# Patient Record
Sex: FEMALE | Race: BLACK OR AFRICAN AMERICAN | NOT HISPANIC OR LATINO
[De-identification: names, ages, dates, MRNs, and addresses within clinical notes are randomized per-mention and may not be internally consistent; named-entity substitution may affect disease eponyms.]

---

## 2018-07-05 PROBLEM — Z00.00 ENCOUNTER FOR PREVENTIVE HEALTH EXAMINATION: Status: ACTIVE | Noted: 2018-07-05

## 2018-12-10 ENCOUNTER — APPOINTMENT (OUTPATIENT)
Dept: INTERNAL MEDICINE | Facility: CLINIC | Age: 83
End: 2018-12-10

## 2021-01-11 ENCOUNTER — APPOINTMENT (OUTPATIENT)
Dept: NEUROLOGY | Facility: CLINIC | Age: 86
End: 2021-01-11
Payer: MEDICARE

## 2021-01-11 VITALS
WEIGHT: 179 LBS | SYSTOLIC BLOOD PRESSURE: 157 MMHG | HEART RATE: 94 BPM | TEMPERATURE: 98.7 F | HEIGHT: 67 IN | DIASTOLIC BLOOD PRESSURE: 73 MMHG | OXYGEN SATURATION: 98 % | BODY MASS INDEX: 28.09 KG/M2

## 2021-01-11 DIAGNOSIS — R29.898 OTHER SYMPTOMS AND SIGNS INVOLVING THE MUSCULOSKELETAL SYSTEM: ICD-10-CM

## 2021-01-11 DIAGNOSIS — M25.569 PAIN IN UNSPECIFIED KNEE: ICD-10-CM

## 2021-01-11 DIAGNOSIS — Z78.9 OTHER SPECIFIED HEALTH STATUS: ICD-10-CM

## 2021-01-11 PROCEDURE — 99204 OFFICE O/P NEW MOD 45 MIN: CPT

## 2021-01-11 RX ORDER — BLOOD SUGAR DIAGNOSTIC
STRIP MISCELLANEOUS
Qty: 50 | Refills: 0 | Status: ACTIVE | COMMUNITY
Start: 2020-02-23

## 2021-01-11 RX ORDER — AMLODIPINE BESYLATE 10 MG/1
10 TABLET ORAL
Qty: 30 | Refills: 0 | Status: ACTIVE | COMMUNITY
Start: 2020-11-16

## 2021-01-11 RX ORDER — ROSUVASTATIN CALCIUM 20 MG/1
20 TABLET, FILM COATED ORAL
Qty: 30 | Refills: 0 | Status: ACTIVE | COMMUNITY
Start: 2020-11-22

## 2021-01-11 RX ORDER — LANCETS 28 GAUGE
EACH MISCELLANEOUS
Qty: 100 | Refills: 0 | Status: ACTIVE | COMMUNITY
Start: 2019-11-17

## 2021-01-11 NOTE — HISTORY OF PRESENT ILLNESS
[FreeTextEntry1] : 84 yo woman with medical conditions as outlined below who presents for further evaluation of weakness in the legs and problems with memory. She is accompanied to the visit by her daughter who aided in giving history. She states her mother has had slowly progressive problems with memory for the past 2 years. Patient lives at home alone and is able to care and cook for herself, however, daughter states she noticed that her mother was running out of her medication prescriptions too quickly. She states she now puts the medication in a pillbox for her mother and that seems to be working out well. She denies her mother talking about things that are not happening. She finds she has to repeat herself at times with her mother. She states patient slipped off the couch a month ago, but denies her hitting her head. She denies her having any other recent falls. Patient denies having bowel or bladder problems and states she can feel when she has to go, however, daughter states patient wears depends. Daughter states patient is able to take care of finances on her own.\par \par Patient reports that she has bilateral knee pain and she feels this is limiting her ability to walk. She denies low back pain or neck pain. She is able to walk with a cane. She denies numbness or tingling in her hands or feet. She has no further complaints.

## 2021-01-11 NOTE — PHYSICAL EXAM
[General Appearance - Alert] : alert [General Appearance - In No Acute Distress] : in no acute distress [Sclera] : the sclera and conjunctiva were normal [PERRL With Normal Accommodation] : pupils were equal in size, round, reactive to light, with normal accommodation [Outer Ear] : the ears and nose were normal in appearance [Neck Appearance] : the appearance of the neck was normal [] : no respiratory distress [Skin Color & Pigmentation] : normal skin color and pigmentation [FreeTextEntry1] : Mental Status: AAO x3, no dysarthria, no aphasia, communicating appropriately MMSE 27/30\par CN: PERRL, EOMI, VFF, V1-V3 sensation intact, hearing grossly intact, no facial asymmetry, tongue midline\par Motor: \par R deltoids 5/5\par R  biceps 5/5\par R triceps 5/5\par R finger abduction 5/5\par R thumb abduction 5/5\par \par L deltoids 5/5\par L biceps 5/5\par L triceps 5/5\par L finger abduction 5/5\par L thumb abduction 5/5\par \par R hip flexion 4/5\par R knee extension 5/5\par R knee flexion 5/5\par R dorsiflexion 5/5\par R plantar flexion 5/5\par  \par L hip flexion 4/5\par L knee extension 5/5\par L knee flexion 5/5\par L dorsiflexion 5/5\par L plantar flexion 5/5\par \par \par Sensory: intact to light touch, and pinprick throughout, decreased vibration sense at toes and ankles bilaterally\par Reflexes: absent bilateral ankle and knee jerks bilaterally, 1+ bilateral biceps\par Coordination: no dysmetria on FTN\par Gait: steady with cane, short stride\par

## 2021-01-11 NOTE — ASSESSMENT
[FreeTextEntry1] : 86 yo woman with unsteady gait 2/2 cervical myelopathy vs neuropathy and memory loss 2/2 mild cognitive impairment vs mild dementia. We discussed starting medication for memory problems, but will hold off at this time until gait addressed. Patient's daughter was advised to supervise patient for safety.\par \par MRI brain w/o contrast\par MRI C spine w/o contrast\par bloodwork\par EMG/NCS\par \par Referral to orthopedics for knee pain\par \par If above workup unrevealing, will consider MRI L Spine \par \par I will see her back 1 week after EMG/NCS or sooner if necessary

## 2021-01-20 ENCOUNTER — APPOINTMENT (OUTPATIENT)
Dept: MRI IMAGING | Facility: CLINIC | Age: 86
End: 2021-01-20
Payer: MEDICARE

## 2021-01-20 ENCOUNTER — OUTPATIENT (OUTPATIENT)
Dept: OUTPATIENT SERVICES | Facility: HOSPITAL | Age: 86
LOS: 1 days | End: 2021-01-20

## 2021-01-20 DIAGNOSIS — R29.898 OTHER SYMPTOMS AND SIGNS INVOLVING THE MUSCULOSKELETAL SYSTEM: ICD-10-CM

## 2021-01-20 PROCEDURE — 70551 MRI BRAIN STEM W/O DYE: CPT | Mod: 26

## 2021-01-20 PROCEDURE — 72141 MRI NECK SPINE W/O DYE: CPT | Mod: 26

## 2021-01-22 ENCOUNTER — APPOINTMENT (OUTPATIENT)
Dept: NEUROLOGY | Facility: CLINIC | Age: 86
End: 2021-01-22
Payer: MEDICARE

## 2021-01-22 PROCEDURE — 95886 MUSC TEST DONE W/N TEST COMP: CPT

## 2021-01-22 PROCEDURE — 95910 NRV CNDJ TEST 7-8 STUDIES: CPT

## 2021-01-26 ENCOUNTER — APPOINTMENT (OUTPATIENT)
Dept: NEUROLOGY | Facility: CLINIC | Age: 86
End: 2021-01-26

## 2021-01-26 NOTE — PROCEDURE
[FreeTextEntry3] : EMG/NCS performed. Please see scanned EMG report.\par \par Patient was advised to follow up next week to review EMG/NCS results as well as prior imaging and lab findings.

## 2021-03-25 ENCOUNTER — EMERGENCY (EMERGENCY)
Facility: HOSPITAL | Age: 86
LOS: 1 days | Discharge: HOME | End: 2021-03-25
Attending: STUDENT IN AN ORGANIZED HEALTH CARE EDUCATION/TRAINING PROGRAM
Payer: MEDICARE

## 2021-03-25 VITALS
HEART RATE: 78 BPM | DIASTOLIC BLOOD PRESSURE: 69 MMHG | RESPIRATION RATE: 18 BRPM | SYSTOLIC BLOOD PRESSURE: 141 MMHG | HEIGHT: 67 IN | TEMPERATURE: 99 F | OXYGEN SATURATION: 98 % | WEIGHT: 179.9 LBS

## 2021-03-25 LAB
ALBUMIN SERPL ELPH-MCNC: 4.2 G/DL — SIGNIFICANT CHANGE UP (ref 3.3–5.2)
ALP SERPL-CCNC: 82 U/L — SIGNIFICANT CHANGE UP (ref 40–120)
ALT FLD-CCNC: 21 U/L — SIGNIFICANT CHANGE UP
ANION GAP SERPL CALC-SCNC: 16 MMOL/L — SIGNIFICANT CHANGE UP (ref 5–17)
AST SERPL-CCNC: 25 U/L — SIGNIFICANT CHANGE UP
BASOPHILS # BLD AUTO: 0.04 K/UL — SIGNIFICANT CHANGE UP (ref 0–0.2)
BASOPHILS NFR BLD AUTO: 0.4 % — SIGNIFICANT CHANGE UP (ref 0–2)
BILIRUB SERPL-MCNC: 0.4 MG/DL — SIGNIFICANT CHANGE UP (ref 0.4–2)
BUN SERPL-MCNC: 17 MG/DL — SIGNIFICANT CHANGE UP (ref 8–20)
CALCIUM SERPL-MCNC: 9.5 MG/DL — SIGNIFICANT CHANGE UP (ref 8.6–10.2)
CHLORIDE SERPL-SCNC: 93 MMOL/L — LOW (ref 98–107)
CO2 SERPL-SCNC: 29 MMOL/L — SIGNIFICANT CHANGE UP (ref 22–29)
CREAT SERPL-MCNC: 0.87 MG/DL — SIGNIFICANT CHANGE UP (ref 0.5–1.3)
EOSINOPHIL # BLD AUTO: 0.15 K/UL — SIGNIFICANT CHANGE UP (ref 0–0.5)
EOSINOPHIL NFR BLD AUTO: 1.7 % — SIGNIFICANT CHANGE UP (ref 0–6)
GLUCOSE SERPL-MCNC: 237 MG/DL — HIGH (ref 70–99)
HCT VFR BLD CALC: 40.1 % — SIGNIFICANT CHANGE UP (ref 34.5–45)
HGB BLD-MCNC: 12.8 G/DL — SIGNIFICANT CHANGE UP (ref 11.5–15.5)
IMM GRANULOCYTES NFR BLD AUTO: 0.3 % — SIGNIFICANT CHANGE UP (ref 0–1.5)
LYMPHOCYTES # BLD AUTO: 3.74 K/UL — HIGH (ref 1–3.3)
LYMPHOCYTES # BLD AUTO: 41.4 % — SIGNIFICANT CHANGE UP (ref 13–44)
MCHC RBC-ENTMCNC: 26.8 PG — LOW (ref 27–34)
MCHC RBC-ENTMCNC: 31.9 GM/DL — LOW (ref 32–36)
MCV RBC AUTO: 83.9 FL — SIGNIFICANT CHANGE UP (ref 80–100)
MONOCYTES # BLD AUTO: 0.74 K/UL — SIGNIFICANT CHANGE UP (ref 0–0.9)
MONOCYTES NFR BLD AUTO: 8.2 % — SIGNIFICANT CHANGE UP (ref 2–14)
NEUTROPHILS # BLD AUTO: 4.34 K/UL — SIGNIFICANT CHANGE UP (ref 1.8–7.4)
NEUTROPHILS NFR BLD AUTO: 48 % — SIGNIFICANT CHANGE UP (ref 43–77)
NT-PROBNP SERPL-SCNC: 48 PG/ML — SIGNIFICANT CHANGE UP (ref 0–300)
PLATELET # BLD AUTO: 213 K/UL — SIGNIFICANT CHANGE UP (ref 150–400)
POTASSIUM SERPL-MCNC: 2.9 MMOL/L — CRITICAL LOW (ref 3.5–5.3)
POTASSIUM SERPL-SCNC: 2.9 MMOL/L — CRITICAL LOW (ref 3.5–5.3)
PROT SERPL-MCNC: 8.2 G/DL — SIGNIFICANT CHANGE UP (ref 6.6–8.7)
RBC # BLD: 4.78 M/UL — SIGNIFICANT CHANGE UP (ref 3.8–5.2)
RBC # FLD: 14.8 % — HIGH (ref 10.3–14.5)
SODIUM SERPL-SCNC: 138 MMOL/L — SIGNIFICANT CHANGE UP (ref 135–145)
TROPONIN T SERPL-MCNC: <0.01 NG/ML — SIGNIFICANT CHANGE UP (ref 0–0.06)
WBC # BLD: 9.04 K/UL — SIGNIFICANT CHANGE UP (ref 3.8–10.5)
WBC # FLD AUTO: 9.04 K/UL — SIGNIFICANT CHANGE UP (ref 3.8–10.5)

## 2021-03-25 PROCEDURE — 93010 ELECTROCARDIOGRAM REPORT: CPT

## 2021-03-25 PROCEDURE — 71045 X-RAY EXAM CHEST 1 VIEW: CPT | Mod: 26

## 2021-03-25 PROCEDURE — 99285 EMERGENCY DEPT VISIT HI MDM: CPT

## 2021-03-25 RX ORDER — DEXTROSE 50 % IN WATER 50 %
15 SYRINGE (ML) INTRAVENOUS ONCE
Refills: 0 | Status: DISCONTINUED | OUTPATIENT
Start: 2021-03-25 | End: 2021-03-30

## 2021-03-25 RX ORDER — SODIUM CHLORIDE 9 MG/ML
1000 INJECTION, SOLUTION INTRAVENOUS
Refills: 0 | Status: DISCONTINUED | OUTPATIENT
Start: 2021-03-25 | End: 2021-03-30

## 2021-03-25 RX ORDER — SODIUM CHLORIDE 9 MG/ML
1000 INJECTION INTRAMUSCULAR; INTRAVENOUS; SUBCUTANEOUS
Refills: 0 | Status: DISCONTINUED | OUTPATIENT
Start: 2021-03-25 | End: 2021-03-30

## 2021-03-25 RX ORDER — POTASSIUM CHLORIDE 20 MEQ
40 PACKET (EA) ORAL EVERY 4 HOURS
Refills: 0 | Status: COMPLETED | OUTPATIENT
Start: 2021-03-25 | End: 2021-03-26

## 2021-03-25 RX ORDER — CHLORTHALIDONE 50 MG
50 TABLET ORAL DAILY
Refills: 0 | Status: DISCONTINUED | OUTPATIENT
Start: 2021-03-25 | End: 2021-03-25

## 2021-03-25 RX ORDER — GLUCAGON INJECTION, SOLUTION 0.5 MG/.1ML
1 INJECTION, SOLUTION SUBCUTANEOUS ONCE
Refills: 0 | Status: DISCONTINUED | OUTPATIENT
Start: 2021-03-25 | End: 2021-03-30

## 2021-03-25 RX ORDER — CHLORTHALIDONE 50 MG
50 TABLET ORAL ONCE
Refills: 0 | Status: DISCONTINUED | OUTPATIENT
Start: 2021-03-25 | End: 2021-03-26

## 2021-03-25 RX ORDER — GLYBURIDE 5 MG
2.5 TABLET ORAL ONCE
Refills: 0 | Status: COMPLETED | OUTPATIENT
Start: 2021-03-25 | End: 2021-03-25

## 2021-03-25 RX ORDER — AMLODIPINE BESYLATE 2.5 MG/1
10 TABLET ORAL ONCE
Refills: 0 | Status: COMPLETED | OUTPATIENT
Start: 2021-03-25 | End: 2021-03-25

## 2021-03-25 RX ORDER — ATORVASTATIN CALCIUM 80 MG/1
80 TABLET, FILM COATED ORAL AT BEDTIME
Refills: 0 | Status: DISCONTINUED | OUTPATIENT
Start: 2021-03-25 | End: 2021-03-30

## 2021-03-25 RX ORDER — INSULIN LISPRO 100/ML
VIAL (ML) SUBCUTANEOUS
Refills: 0 | Status: DISCONTINUED | OUTPATIENT
Start: 2021-03-25 | End: 2021-03-30

## 2021-03-25 RX ORDER — LEVOTHYROXINE SODIUM 125 MCG
75 TABLET ORAL DAILY
Refills: 0 | Status: DISCONTINUED | OUTPATIENT
Start: 2021-03-25 | End: 2021-03-30

## 2021-03-25 RX ORDER — DEXTROSE 50 % IN WATER 50 %
25 SYRINGE (ML) INTRAVENOUS ONCE
Refills: 0 | Status: DISCONTINUED | OUTPATIENT
Start: 2021-03-25 | End: 2021-03-30

## 2021-03-25 RX ORDER — MAGNESIUM SULFATE 500 MG/ML
2 VIAL (ML) INJECTION ONCE
Refills: 0 | Status: COMPLETED | OUTPATIENT
Start: 2021-03-25 | End: 2021-03-25

## 2021-03-25 RX ORDER — DEXTROSE 50 % IN WATER 50 %
12.5 SYRINGE (ML) INTRAVENOUS ONCE
Refills: 0 | Status: DISCONTINUED | OUTPATIENT
Start: 2021-03-25 | End: 2021-03-30

## 2021-03-25 RX ORDER — POTASSIUM CHLORIDE 20 MEQ
10 PACKET (EA) ORAL ONCE
Refills: 0 | Status: COMPLETED | OUTPATIENT
Start: 2021-03-25 | End: 2021-03-25

## 2021-03-25 RX ORDER — POTASSIUM CHLORIDE 20 MEQ
10 PACKET (EA) ORAL DAILY
Refills: 0 | Status: DISCONTINUED | OUTPATIENT
Start: 2021-03-25 | End: 2021-03-30

## 2021-03-25 RX ADMIN — Medication 100 MILLIEQUIVALENT(S): at 22:18

## 2021-03-25 RX ADMIN — Medication 40 MILLIEQUIVALENT(S): at 22:17

## 2021-03-25 NOTE — ED ADULT NURSE NOTE - OBJECTIVE STATEMENT
Patient A&Ox4 complaining of dizziness.  Pt reports sitting in chair and feeling dizzy at home.  Pt reports feeling od dizziness has passed.  Denies, LOC, SOB, CP.  Equal b/l strength, no facial droop present, no slurred speech.  Cardiac monitoring and pulse oximetry initiated, NSR on monitor. NAD noted, respirations even and unlabored.  Safety precautions in place.  Plan of care explained, pt verbalized understanding.

## 2021-03-25 NOTE — ED PROVIDER NOTE - OBJECTIVE STATEMENT
84 y/o female with PMHx of HTN, DM c/o feeling Nausea indigestion syncope vs near syncope? at home. Pt states she was okay earlier today was sitting in chair may having synopsized and still feels weak. Denies SOB, fever chills active CP, Endorses indigestion.  No other complaints     [facility] normal stress test last year,

## 2021-03-25 NOTE — ED PROVIDER NOTE - NS ED ROS FT
ROS: No fever/chills. No eye pain/changes in vision, No ear pain/sore throat/dysphagia, No chest pain/palpitations. No SOB/cough/. + nausea, indigestion No abdominal pain, V/D, no black/bloody bm. No dysuria/frequency/discharge, No headache. No Dizziness.    No rashes or breaks in skin. + weakness No numbness/tingling.

## 2021-03-25 NOTE — ED ADULT NURSE NOTE - NSIMPLEMENTINTERV_GEN_ALL_ED
Implemented All Fall Risk Interventions:  [place] to call system. Call bell, personal items and telephone within reach. Instruct patient to call for assistance. Room bathroom lighting operational. Non-slip footwear when patient is off stretcher. Physically safe environment: no spills, clutter or unnecessary equipment. Stretcher in lowest position, wheels locked, appropriate side rails in place. Provide visual cue, wrist band, yellow gown, etc. Monitor gait and stability. Monitor for mental status changes and reorient to person, place, and time. Review medications for side effects contributing to fall risk. Reinforce activity limits and safety measures with patient and family.

## 2021-03-25 NOTE — ED ADULT TRIAGE NOTE - CHIEF COMPLAINT QUOTE
pt A&Ox 4 BIBA from home s/p syncopal episode while sitting in chair witnessed by daughter. EMS said house was hot and pt just brought in groceries. Pt currently c/o feeling weak but denies any other symptoms at this time. no neuro deficits noted. Pt denies any known cardiac history

## 2021-03-25 NOTE — ED PROVIDER NOTE - CLINICAL SUMMARY MEDICAL DECISION MAKING FREE TEXT BOX
syncope vs near syncope bifascicular block on ekg w/o prior ekg , [facility], recommend co trop and tele monitoring overnight, re-asses in morning

## 2021-03-25 NOTE — ED PROVIDER NOTE - PHYSICAL EXAMINATION
Gen: No acute distress, non toxic, slightly  rundown  appearing  HEENT: Mucous membranes moist, pink conjunctivae, EOMI  CV: RRR, nl s1/s2. equal pulse bilateral  Resp: CTAB, normal rate and effort  GI: Abdomen soft, NT, ND. No rebound, no guarding  : No CVAT  Neuro: A&O x 3, moving all 4 extremities, normal motor strength sensation cn 2-12  MSK: No spine or joint tenderness to palpation 1+ pitting edema bilaterally  Skin: No rashes. intact and perfused.

## 2021-03-26 VITALS
DIASTOLIC BLOOD PRESSURE: 73 MMHG | HEART RATE: 78 BPM | TEMPERATURE: 98 F | SYSTOLIC BLOOD PRESSURE: 152 MMHG | OXYGEN SATURATION: 99 % | RESPIRATION RATE: 18 BRPM

## 2021-03-26 DIAGNOSIS — Z90.711 ACQUIRED ABSENCE OF UTERUS WITH REMAINING CERVICAL STUMP: Chronic | ICD-10-CM

## 2021-03-26 LAB
ANION GAP SERPL CALC-SCNC: 11 MMOL/L — SIGNIFICANT CHANGE UP (ref 5–17)
BUN SERPL-MCNC: 13 MG/DL — SIGNIFICANT CHANGE UP (ref 8–20)
CALCIUM SERPL-MCNC: 9.2 MG/DL — SIGNIFICANT CHANGE UP (ref 8.6–10.2)
CHLORIDE SERPL-SCNC: 100 MMOL/L — SIGNIFICANT CHANGE UP (ref 98–107)
CO2 SERPL-SCNC: 27 MMOL/L — SIGNIFICANT CHANGE UP (ref 22–29)
CREAT SERPL-MCNC: 0.77 MG/DL — SIGNIFICANT CHANGE UP (ref 0.5–1.3)
GLUCOSE BLDC GLUCOMTR-MCNC: 135 MG/DL — HIGH (ref 70–99)
GLUCOSE BLDC GLUCOMTR-MCNC: 173 MG/DL — HIGH (ref 70–99)
GLUCOSE SERPL-MCNC: 181 MG/DL — HIGH (ref 70–99)
HCT VFR BLD CALC: 38.7 % — SIGNIFICANT CHANGE UP (ref 34.5–45)
HGB BLD-MCNC: 12.6 G/DL — SIGNIFICANT CHANGE UP (ref 11.5–15.5)
MAGNESIUM SERPL-MCNC: 1.9 MG/DL — SIGNIFICANT CHANGE UP (ref 1.6–2.6)
MAGNESIUM SERPL-MCNC: 3 MG/DL — HIGH (ref 1.6–2.6)
MCHC RBC-ENTMCNC: 27.6 PG — SIGNIFICANT CHANGE UP (ref 27–34)
MCHC RBC-ENTMCNC: 32.6 GM/DL — SIGNIFICANT CHANGE UP (ref 32–36)
MCV RBC AUTO: 84.9 FL — SIGNIFICANT CHANGE UP (ref 80–100)
PLATELET # BLD AUTO: 191 K/UL — SIGNIFICANT CHANGE UP (ref 150–400)
POTASSIUM SERPL-MCNC: 4 MMOL/L — SIGNIFICANT CHANGE UP (ref 3.5–5.3)
POTASSIUM SERPL-SCNC: 4 MMOL/L — SIGNIFICANT CHANGE UP (ref 3.5–5.3)
RBC # BLD: 4.56 M/UL — SIGNIFICANT CHANGE UP (ref 3.8–5.2)
RBC # FLD: 14.7 % — HIGH (ref 10.3–14.5)
SODIUM SERPL-SCNC: 138 MMOL/L — SIGNIFICANT CHANGE UP (ref 135–145)
TROPONIN T SERPL-MCNC: <0.01 NG/ML — SIGNIFICANT CHANGE UP (ref 0–0.06)
TROPONIN T SERPL-MCNC: <0.01 NG/ML — SIGNIFICANT CHANGE UP (ref 0–0.06)
WBC # BLD: 8.63 K/UL — SIGNIFICANT CHANGE UP (ref 3.8–10.5)
WBC # FLD AUTO: 8.63 K/UL — SIGNIFICANT CHANGE UP (ref 3.8–10.5)

## 2021-03-26 PROCEDURE — 85025 COMPLETE CBC W/AUTO DIFF WBC: CPT

## 2021-03-26 PROCEDURE — 82962 GLUCOSE BLOOD TEST: CPT

## 2021-03-26 PROCEDURE — 85027 COMPLETE CBC AUTOMATED: CPT

## 2021-03-26 PROCEDURE — 36415 COLL VENOUS BLD VENIPUNCTURE: CPT

## 2021-03-26 PROCEDURE — 93010 ELECTROCARDIOGRAM REPORT: CPT

## 2021-03-26 PROCEDURE — 71045 X-RAY EXAM CHEST 1 VIEW: CPT

## 2021-03-26 PROCEDURE — 80053 COMPREHEN METABOLIC PANEL: CPT

## 2021-03-26 PROCEDURE — 96366 THER/PROPH/DIAG IV INF ADDON: CPT

## 2021-03-26 PROCEDURE — G0378: CPT

## 2021-03-26 PROCEDURE — 99284 EMERGENCY DEPT VISIT MOD MDM: CPT | Mod: 25

## 2021-03-26 PROCEDURE — 83735 ASSAY OF MAGNESIUM: CPT

## 2021-03-26 PROCEDURE — 80048 BASIC METABOLIC PNL TOTAL CA: CPT

## 2021-03-26 PROCEDURE — 83880 ASSAY OF NATRIURETIC PEPTIDE: CPT

## 2021-03-26 PROCEDURE — 96365 THER/PROPH/DIAG IV INF INIT: CPT

## 2021-03-26 PROCEDURE — 99234 HOSP IP/OBS SM DT SF/LOW 45: CPT

## 2021-03-26 PROCEDURE — 84484 ASSAY OF TROPONIN QUANT: CPT

## 2021-03-26 PROCEDURE — 96367 TX/PROPH/DG ADDL SEQ IV INF: CPT

## 2021-03-26 PROCEDURE — 93005 ELECTROCARDIOGRAM TRACING: CPT

## 2021-03-26 RX ORDER — ACETAMINOPHEN 500 MG
650 TABLET ORAL EVERY 6 HOURS
Refills: 0 | Status: DISCONTINUED | OUTPATIENT
Start: 2021-03-26 | End: 2021-03-30

## 2021-03-26 RX ORDER — CHLORTHALIDONE 50 MG
50 TABLET ORAL DAILY
Refills: 0 | Status: DISCONTINUED | OUTPATIENT
Start: 2021-03-26 | End: 2021-03-30

## 2021-03-26 RX ADMIN — Medication 50 MILLIGRAM(S): at 12:42

## 2021-03-26 RX ADMIN — Medication 40 MILLIEQUIVALENT(S): at 04:03

## 2021-03-26 RX ADMIN — Medication 10 MILLIEQUIVALENT(S): at 12:42

## 2021-03-26 RX ADMIN — SODIUM CHLORIDE 75 MILLILITER(S): 9 INJECTION INTRAMUSCULAR; INTRAVENOUS; SUBCUTANEOUS at 04:05

## 2021-03-26 RX ADMIN — Medication 2 GRAM(S): at 03:48

## 2021-03-26 RX ADMIN — Medication 650 MILLIGRAM(S): at 05:47

## 2021-03-26 RX ADMIN — Medication 50 GRAM(S): at 01:57

## 2021-03-26 RX ADMIN — Medication 2: at 09:05

## 2021-03-26 RX ADMIN — Medication 650 MILLIGRAM(S): at 04:04

## 2021-03-26 RX ADMIN — Medication 10 MILLIEQUIVALENT(S): at 00:09

## 2021-03-26 NOTE — CONSULT NOTE ADULT - ASSESSMENT
86 yo F w/ h/o RBBB/LAFB, normal LVEF/normal nuclear stress test 2019, DM, HTN, HLD who p/w lightheadedness and denies syncope, chest pain, dyspnea, palpitations.       *03/26- telemetry is negative, ECG unchanged, no report of syncope       PLAN:    1. *Patient OK for discharge from a cardiac perspective  2. Continue with regular cardiovascular regimen  3. Outpt f/u 2 weeks

## 2021-03-26 NOTE — ED ADULT NURSE REASSESSMENT NOTE - NS ED NURSE REASSESS COMMENT FT1
Pt received  in the stretcher  resting comfortably, no distress noted , VSS , awaiting cardiology eval , will continue to monitor

## 2021-03-26 NOTE — ED CDU PROVIDER DISPOSITION NOTE - PATIENT PORTAL LINK FT
You can access the FollowMyHealth Patient Portal offered by [facility] by registering at the following website: [email or web address]. By joining [employer]s FollowMyHealth portal, you will also be able to view your health information using other applications (apps) compatible with our system.

## 2021-03-26 NOTE — ED CDU PROVIDER INITIAL DAY NOTE - OBJECTIVE STATEMENT
86 y/o female with PMHx of HTN, DM c/o feeling Nausea indigestion syncope vs near syncope? at home. Pt states she was okay earlier today was sitting in chair may having synopsized and still feels weak. Denies SOB, fever chills active CP, Endorses indigestion.  No other complaints     H&H stable, initial trop was negative. Pt found to have hypokalemia of 2.9. Pt notes she takes potassium daily. When speaking to daughter, she noted that stool had been black in the past few weeks. Pt notes black stool has subsided at this time. Not currently on blood thinners. Hemoglobin of 12.8. Pt denies chest pain, shortness of breath at this time.    At  [facility] normal stress test last year, 86 y/o female with PMHx of HTN, DM c/o feeling Nausea indigestion syncope vs near syncope? at home. Pt states she was okay earlier today was sitting in chair may having synopsized and still feels weak. Denies SOB, fever chills active CP, Endorses indigestion.  No other complaints     H&H stable, initial trop was negative. Pt found to have hypokalemia of 2.9. Pt notes she takes potassium daily. When speaking to daughter, she noted that stool had been black in the past few weeks. Pt notes black stool has subsided at this time. Not currently on blood thinners. Hemoglobin of 12.8. Pt denies chest pain, shortness of breath at this time. EKG shows bifascicular block, no prior ekg noted    At  [facility] normal stress test last year,

## 2021-03-26 NOTE — ED CDU PROVIDER DISPOSITION NOTE - NSFOLLOWUPINSTRUCTIONS_ED_ALL_ED_FT
please follow with cardiology office in the next two weeks   please continue with regular cardiovascular regimen    Syncope    Syncope is when you temporarily lose consciousness, also called fainting or passing out. It is caused by a sudden decrease in blood flow to the brain. Even though most causes of syncope are not dangerous, syncope can possibly be a sign of a serious medical problem. Signs that you may be about to faint include feeling dizzy, lightheaded, nausea, visual changes, or cold/clammy skin. Do not drive, operate heavy machinery, or play sports until your health care provider says it is okay.    SEEK IMMEDIATE MEDICAL CARE IF YOU HAVE ANY OF THE FOLLOWING SYMPTOMS: severe headache, pain in your chest/abdomen/back, bleeding from your mouth or rectum, palpitations, shortness of breath, pain with breathing, seizure, confusion, or trouble walking.

## 2021-03-26 NOTE — ED CDU PROVIDER DISPOSITION NOTE - CLINICAL COURSE
84 yo male hx of htn hld dm presenting to er with near syncopal episode. placed in observation for tele monitoring serial trops and cards consult. pt asymptomatic no chest pain stable vitals. cleared by cardiology for discharge with fu with cardiology in 2 weeks

## 2021-03-26 NOTE — ED CDU PROVIDER DISPOSITION NOTE - ATTENDING CONTRIBUTION TO CARE
86 yo female present for evaluation of syncope. I personally saw the patient with the PA, and completed the key components of the history and physical exam. I then discussed the management plan with the PA.

## 2021-03-26 NOTE — ED CDU PROVIDER DISPOSITION NOTE - CARE PROVIDER_API CALL
Cristiano Phillip  Cardiovascular Diseases  [address] [place] [address]  Phone: [phone]  Fax: [phone]  Follow Up Time: Routine

## 2021-03-26 NOTE — ED CDU PROVIDER INITIAL DAY NOTE - MEDICAL DECISION MAKING DETAILS
Telemetry, trend troponins, cbc, re-evaluate, cardiac consult in the am, [facility]. If h&H dropping on repeat, will perform stool guaiac

## 2021-03-26 NOTE — ED CDU PROVIDER INITIAL DAY NOTE - PMH
HLD (hyperlipidemia)    HTN (hypertension)    Hypokalemia    Hypothyroidism    Type 2 diabetes mellitus

## 2021-03-26 NOTE — ED CDU PROVIDER INITIAL DAY NOTE - ATTENDING CONTRIBUTION TO CARE
I agree with the PA's note and was available for any issues/concerns. I was directly involved in patient care. My brief overall assessment is as follows: bifasicular block without prior ekg, near syncope and indigestion/weakness and hypokalemia. [facility] to see pt in morning, replete k, trend h and h (pt later states had black stool that resolved with hgb 12.8).

## 2021-03-26 NOTE — ED ADULT NURSE REASSESSMENT NOTE - COMFORT CARE
assisted to bathroom/plan of care explained/repositioned/treatment delay explained/wait time explained/warm blanket provided

## 2021-03-26 NOTE — CONSULT NOTE ADULT - SUBJECTIVE AND OBJECTIVE BOX
Patient is a 85y old  Female who presents with a chief complaint of       PAST MEDICAL & SURGICAL HISTORY:  Hypothyroidism    Hypokalemia    Type 2 diabetes mellitus    HTN (hypertension)    HLD (hyperlipidemia)    History of partial hysterectomy        Summary of admission HPI:                PREVIOUS DIAGNOSTIC TESTING:      ECHO  FINDINGS:    STRESS  FINDINGS:    CATHETERIZATION  FINDINGS:    ELECTROPHYSIOLOGY STUDY  FINDINGS:    CAROTID ULTRASOUND:  FINDINGS    VENOUS DUPLEX SCAN:  FINDINGS:    CHEST CT PULMONARY ANGIO with IV Contrast:  FINDINGS:  MEDICATIONS  (STANDING):  atorvastatin 80 milliGRAM(s) Oral at bedtime  chlorthalidone 50 milliGRAM(s) Oral daily  dextrose 40% Gel 15 Gram(s) Oral once  dextrose 5%. 1000 milliLiter(s) (50 mL/Hr) IV Continuous <Continuous>  dextrose 5%. 1000 milliLiter(s) (100 mL/Hr) IV Continuous <Continuous>  dextrose 50% Injectable 25 Gram(s) IV Push once  dextrose 50% Injectable 12.5 Gram(s) IV Push once  dextrose 50% Injectable 25 Gram(s) IV Push once  glucagon  Injectable 1 milliGRAM(s) IntraMuscular once  insulin lispro (ADMELOG) corrective regimen sliding scale   SubCutaneous three times a day before meals  levothyroxine 75 MICROGram(s) Oral daily  potassium chloride    Tablet ER 10 milliEquivalent(s) Oral daily  sodium chloride 0.9%. 1000 milliLiter(s) (75 mL/Hr) IV Continuous <Continuous>    MEDICATIONS  (PRN):  acetaminophen   Tablet .. 650 milliGRAM(s) Oral every 6 hours PRN Mild Pain (1 - 3), Moderate Pain (4 - 6)      FAMILY HISTORY:  No pertinent family history in first degree relatives        SOCIAL HISTORY:    CIGARETTES:    ALCOHOL:    REVIEW OF SYSTEMS:    CONSTITUTIONAL: No fever, weight loss, chills, shakes, or fatigue  EYES: No eye pain, visual disturbances, or discharge  ENMT:  No difficulty hearing, tinnitus, vertigo; No sinus or throat pain  NECK: No pain or stiffness  BREASTS: No pain, masses, or nipple discharge  RESPIRATORY: No cough, wheezing, hemoptysis, or shortness of breath  CARDIOVASCULAR: No chest pain, dyspnea, palpitations, dizziness, syncope, paroxysmal nocturnal dyspnea, orthopnea, or arm or leg swelling  GASTROINTESTINAL: No abdominal  or epigastric pain, nausea, vomiting, hematemesis, diarrhea, constipation, melena or bright red blood.  GENITOURINARY: No dysuria, nocturia, hematuria, or urinary incontinence  NEUROLOGICAL: No headaches, memory loss, slurred speech, limb weakness, loss of strength, numbness, or tremors  SKIN: No itching, burning, rashes, or lesions   LYMPH NODES: No enlarged glands  ENDOCRINE: No heat or cold intolerance, or hair loss  MUSCULOSKELETAL: No joint pain or swelling, muscle, back, or extremity pain  PSYCHIATRIC: No depression, anxiety, or difficulty sleeping  HEME/LYMPH: No easy bruising or bleeding gums  ALLERY AND IMMUNOLOGIC: No hives or rash.      Vital Signs Last 24 Hrs  T(C): 37.1 (26 Mar 2021 07:42), Max: 37.2 (25 Mar 2021 19:15)  T(F): 98.8 (26 Mar 2021 07:42), Max: 98.9 (25 Mar 2021 19:15)  HR: 83 (26 Mar 2021 07:42) (78 - 98)  BP: 135/63 (26 Mar 2021 07:42) (126/60 - 141/69)  BP(mean): --  RR: 18 (26 Mar 2021 07:42) (18 - 19)  SpO2: 97% (26 Mar 2021 07:42) (96% - 98%)    PHYSICAL EXAM:    GENERAL: In no apparent distress, well nourished, and hydrated.  HEAD:  Atraumatic, Normocephalic  EYES: EOMI, PERRLA, conjunctiva and sclera clear  ENMT: No tonsillar erythema, exudates, or enlargement; Moist mucous membranes, Good dentition, No lesions  NECK: Supple and normal thyroid.  No JVD or carotid bruit.  Carotid pulse is 2+ bilaterally.  HEART: Regular rate and rhythm; No murmurs, rubs, or gallops.  PULMONARY: Clear to auscultation and perfusion.  No rales, wheezing, or rhonchi bilaterally.  ABDOMEN: Soft, Nontender, Nondistended; Bowel sounds present  EXTREMITIES:  2+ Peripheral Pulses, No clubbing, cyanosis, or edema  LYMPH: No lymphadenopathy noted  NEUROLOGICAL: Grossly nonfocal          INTERPRETATION OF TELEMETRY:    ECG:    I&O's Detail      LABS:                        12.6   8.63  )-----------( 191      ( 26 Mar 2021 04:12 )             38.7     03-26    138  |  100  |  13.0  ----------------------------<  181<H>  4.0   |  27.0  |  0.77    Ca    9.2      26 Mar 2021 06:46  Mg     3.0     03-26    TPro  8.2  /  Alb  4.2  /  TBili  0.4  /  DBili  x   /  AST  25  /  ALT  21  /  AlkPhos  82  03-25    CARDIAC MARKERS ( 26 Mar 2021 04:12 )  x     / <0.01 ng/mL / x     / x     / x      CARDIAC MARKERS ( 26 Mar 2021 02:17 )  x     / <0.01 ng/mL / x     / x     / x      CARDIAC MARKERS ( 25 Mar 2021 20:09 )  x     / <0.01 ng/mL / x     / x     / x              BNPSerum Pro-Brain Natriuretic Peptide: 48 pg/mL (03-25 @ 20:09)    I&O's Detail    Daily Height in cm: 170.18 (25 Mar 2021 19:15)    Daily     RADIOLOGY & ADDITIONAL STUDIES: Patient is a 85y old  Female who presents with a chief complaint of       PAST MEDICAL & SURGICAL HISTORY:  Hypothyroidism    Hypokalemia    Type 2 diabetes mellitus    HTN (hypertension)    HLD (hyperlipidemia)    History of partial hysterectomy        Summary of admission HPI:    84 yo F w/ h/o RBBB/LAFB, normal LVEF/normal nuclear stress test 2019, DM, HTN, HLD who p/w lightheadedness and denies syncope, chest pain, dyspnea, palpitations.               CHEST CT PULMONARY ANGIO with IV Contrast:  FINDINGS:  MEDICATIONS  (STANDING):  atorvastatin 80 milliGRAM(s) Oral at bedtime  chlorthalidone 50 milliGRAM(s) Oral daily  dextrose 40% Gel 15 Gram(s) Oral once  dextrose 5%. 1000 milliLiter(s) (50 mL/Hr) IV Continuous <Continuous>  dextrose 5%. 1000 milliLiter(s) (100 mL/Hr) IV Continuous <Continuous>  dextrose 50% Injectable 25 Gram(s) IV Push once  dextrose 50% Injectable 12.5 Gram(s) IV Push once  dextrose 50% Injectable 25 Gram(s) IV Push once  glucagon  Injectable 1 milliGRAM(s) IntraMuscular once  insulin lispro (ADMELOG) corrective regimen sliding scale   SubCutaneous three times a day before meals  levothyroxine 75 MICROGram(s) Oral daily  potassium chloride    Tablet ER 10 milliEquivalent(s) Oral daily  sodium chloride 0.9%. 1000 milliLiter(s) (75 mL/Hr) IV Continuous <Continuous>    MEDICATIONS  (PRN):  acetaminophen   Tablet .. 650 milliGRAM(s) Oral every 6 hours PRN Mild Pain (1 - 3), Moderate Pain (4 - 6)      FAMILY HISTORY:  No pertinent family history in first degree relatives      REVIEW OF SYSTEMS:    CONSTITUTIONAL: No fever, weight loss, chills, shakes, or fatigue  EYES: No eye pain, visual disturbances, or discharge  ENMT:  No difficulty hearing, tinnitus, vertigo; No sinus or throat pain  SKIN: No itching, burning, rashes, or lesions   LYMPH NODES: No enlarged glands  ENDOCRINE: No heat or cold intolerance, or hair loss  MUSCULOSKELETAL: No joint pain or swelling, muscle, back, or extremity pain  PSYCHIATRIC: No depression, anxiety, or difficulty sleeping  HEME/LYMPH: No easy bruising or bleeding gums  ALLERY AND IMMUNOLOGIC: No hives or rash.      Vital Signs Last 24 Hrs  T(C): 37.1 (26 Mar 2021 07:42), Max: 37.2 (25 Mar 2021 19:15)  T(F): 98.8 (26 Mar 2021 07:42), Max: 98.9 (25 Mar 2021 19:15)  HR: 83 (26 Mar 2021 07:42) (78 - 98)  BP: 135/63 (26 Mar 2021 07:42) (126/60 - 141/69)  BP(mean): --  RR: 18 (26 Mar 2021 07:42) (18 - 19)  SpO2: 97% (26 Mar 2021 07:42) (96% - 98%)    PHYSICAL EXAM:    GENERAL: In no apparent distress, well nourished, and hydrated.  NECK: Supple and normal thyroid.  No JVD or carotid bruit.  Carotid pulse is 2+ bilaterally.  HEART: Regular rate and rhythm; No murmurs, rubs, or gallops.  PULMONARY: Clear to auscultation and perfusion.  No rales, wheezing, or rhonchi bilaterally.  ABDOMEN: Soft, Nontender, Nondistended; Bowel sounds present  EXTREMITIES:  2+ Peripheral Pulses, No clubbing, cyanosis, or edema        INTERPRETATION OF TELEMETRY: sinus rhythm no significant events     ECG: sinus rhythm, RBBB, LAFB     I&O's Detail      LABS:                        12.6   8.63  )-----------( 191      ( 26 Mar 2021 04:12 )             38.7     03-26    138  |  100  |  13.0  ----------------------------<  181<H>  4.0   |  27.0  |  0.77    Ca    9.2      26 Mar 2021 06:46  Mg     3.0     03-26    TPro  8.2  /  Alb  4.2  /  TBili  0.4  /  DBili  x   /  AST  25  /  ALT  21  /  AlkPhos  82  03-25    CARDIAC MARKERS ( 26 Mar 2021 04:12 )  x     / <0.01 ng/mL / x     / x     / x      CARDIAC MARKERS ( 26 Mar 2021 02:17 )  x     / <0.01 ng/mL / x     / x     / x      CARDIAC MARKERS ( 25 Mar 2021 20:09 )  x     / <0.01 ng/mL / x     / x     / x              BNPSerum Pro-Brain Natriuretic Peptide: 48 pg/mL (03-25 @ 20:09)    I&O's Detail    Daily Height in cm: 170.18 (25 Mar 2021 19:15)    Daily     RADIOLOGY & ADDITIONAL STUDIES:

## 2021-03-29 PROBLEM — E87.6 HYPOKALEMIA: Chronic | Status: ACTIVE | Noted: 2021-03-26

## 2021-03-29 PROBLEM — I10 ESSENTIAL (PRIMARY) HYPERTENSION: Chronic | Status: ACTIVE | Noted: 2021-03-26

## 2021-03-29 PROBLEM — E03.9 HYPOTHYROIDISM, UNSPECIFIED: Chronic | Status: ACTIVE | Noted: 2021-03-26

## 2021-03-29 PROBLEM — E11.9 TYPE 2 DIABETES MELLITUS WITHOUT COMPLICATIONS: Chronic | Status: ACTIVE | Noted: 2021-03-26

## 2021-03-29 PROBLEM — E78.5 HYPERLIPIDEMIA, UNSPECIFIED: Chronic | Status: ACTIVE | Noted: 2021-03-26

## 2021-05-14 ENCOUNTER — APPOINTMENT (OUTPATIENT)
Dept: NEUROLOGY | Facility: CLINIC | Age: 86
End: 2021-05-14

## 2021-06-08 ENCOUNTER — APPOINTMENT (OUTPATIENT)
Dept: NEUROLOGY | Facility: CLINIC | Age: 86
End: 2021-06-08
Payer: MEDICARE

## 2021-06-08 VITALS
HEIGHT: 67 IN | TEMPERATURE: 98.3 F | WEIGHT: 185 LBS | BODY MASS INDEX: 29.03 KG/M2 | SYSTOLIC BLOOD PRESSURE: 140 MMHG | DIASTOLIC BLOOD PRESSURE: 80 MMHG

## 2021-06-08 PROCEDURE — 99215 OFFICE O/P EST HI 40 MIN: CPT

## 2021-07-07 ENCOUNTER — RX CHANGE (OUTPATIENT)
Age: 86
End: 2021-07-07

## 2021-07-09 ENCOUNTER — RX CHANGE (OUTPATIENT)
Age: 86
End: 2021-07-09

## 2021-07-19 ENCOUNTER — APPOINTMENT (OUTPATIENT)
Dept: NEUROLOGY | Facility: CLINIC | Age: 86
End: 2021-07-19
Payer: MEDICARE

## 2021-07-19 DIAGNOSIS — R55 SYNCOPE AND COLLAPSE: ICD-10-CM

## 2021-07-19 DIAGNOSIS — R27.0 ATAXIA, UNSPECIFIED: ICD-10-CM

## 2021-07-19 PROCEDURE — 99214 OFFICE O/P EST MOD 30 MIN: CPT | Mod: 95

## 2021-07-19 RX ORDER — DONEPEZIL HYDROCHLORIDE 5 MG/1
5 TABLET ORAL DAILY
Qty: 30 | Refills: 2 | Status: DISCONTINUED | COMMUNITY
Start: 2021-06-08 | End: 2021-07-19

## 2021-08-13 ENCOUNTER — RX CHANGE (OUTPATIENT)
Age: 86
End: 2021-08-13

## 2021-08-18 ENCOUNTER — RX CHANGE (OUTPATIENT)
Age: 86
End: 2021-08-18

## 2021-08-18 RX ORDER — DONEPEZIL HYDROCHLORIDE 10 MG/1
10 TABLET ORAL DAILY
Qty: 30 | Refills: 6 | Status: DISCONTINUED | COMMUNITY
Start: 2021-07-19 | End: 2021-08-18

## 2021-11-22 ENCOUNTER — APPOINTMENT (OUTPATIENT)
Dept: NEUROLOGY | Facility: CLINIC | Age: 86
End: 2021-11-22

## 2022-12-02 ENCOUNTER — RX RENEWAL (OUTPATIENT)
Age: 87
End: 2022-12-02

## 2023-01-03 ENCOUNTER — RX RENEWAL (OUTPATIENT)
Age: 88
End: 2023-01-03

## 2023-01-26 LAB
HBA1C MFR BLD HPLC: 7.9
LDLC SERPL DIRECT ASSAY-MCNC: 95
MICROALBUMIN/CREAT 24H UR-RTO: 18

## 2023-01-27 ENCOUNTER — RESULT CHARGE (OUTPATIENT)
Age: 88
End: 2023-01-27

## 2023-01-27 ENCOUNTER — APPOINTMENT (OUTPATIENT)
Dept: ENDOCRINOLOGY | Facility: CLINIC | Age: 88
End: 2023-01-27
Payer: MEDICARE

## 2023-01-27 VITALS
OXYGEN SATURATION: 98 % | SYSTOLIC BLOOD PRESSURE: 126 MMHG | HEART RATE: 108 BPM | WEIGHT: 189 LBS | HEIGHT: 67 IN | BODY MASS INDEX: 29.66 KG/M2 | DIASTOLIC BLOOD PRESSURE: 60 MMHG

## 2023-01-27 DIAGNOSIS — Z78.9 OTHER SPECIFIED HEALTH STATUS: ICD-10-CM

## 2023-01-27 DIAGNOSIS — Z80.0 FAMILY HISTORY OF MALIGNANT NEOPLASM OF DIGESTIVE ORGANS: ICD-10-CM

## 2023-01-27 DIAGNOSIS — Z95.818 PRESENCE OF OTHER CARDIAC IMPLANTS AND GRAFTS: ICD-10-CM

## 2023-01-27 DIAGNOSIS — Z83.3 FAMILY HISTORY OF DIABETES MELLITUS: ICD-10-CM

## 2023-01-27 DIAGNOSIS — I49.9 CARDIAC ARRHYTHMIA, UNSPECIFIED: ICD-10-CM

## 2023-01-27 DIAGNOSIS — I10 ESSENTIAL (PRIMARY) HYPERTENSION: ICD-10-CM

## 2023-01-27 LAB — GLUCOSE BLDC GLUCOMTR-MCNC: 344

## 2023-01-27 PROCEDURE — 99204 OFFICE O/P NEW MOD 45 MIN: CPT | Mod: 25

## 2023-01-27 PROCEDURE — 82962 GLUCOSE BLOOD TEST: CPT

## 2023-01-27 RX ORDER — DAPAGLIFLOZIN 5 MG/1
5 TABLET, FILM COATED ORAL
Qty: 30 | Refills: 0 | Status: DISCONTINUED | COMMUNITY
Start: 2020-11-16 | End: 2023-01-27

## 2023-01-27 RX ORDER — POTASSIUM CHLORIDE 1125 MG/1
15 TABLET, EXTENDED RELEASE ORAL
Refills: 0 | Status: ACTIVE | COMMUNITY
Start: 2020-12-20

## 2023-01-27 RX ORDER — CHLORTHALIDONE 50 MG/1
50 TABLET ORAL EVERY OTHER DAY
Refills: 0 | Status: ACTIVE | COMMUNITY
Start: 2020-08-19

## 2023-01-27 NOTE — HISTORY OF PRESENT ILLNESS
[FreeTextEntry1] : Quality: Type 2\par Severity: uncontrolled\par Duration: 20+ years\par Onset: routine blood test\par Notes: no prior endo; she has never needed emergency treatment for hypo/hyperglycemia\par \par ASSOCIATED SYMPTOMS/COMPLICATIONS: denies complications, eye exam 2022\par \par MODIFYING FACTORS: \par Lifestyle: eats 2-3 meals daily, snacks (mason cracker and ice cream) in between meals\par Medication history: had been on Jardiance and Farixa but stopped due to insurance/cost, did not tolerate MFN in past, had been on pioglitazone but caused hypoglycemia\par Current DM meds: Glyburide 2.5 mg BID\par \par SMBG: Freestyle meter, testing 1x daily - fasting, 189-268; daughter reports hypoglycemix sx (sweating) - has passed out 2x but not sure if it was diabetes related, daughter does not recall sugar at time EMS came\par \par

## 2023-01-27 NOTE — PHYSICAL EXAM
[Alert] : alert [No Acute Distress] : no acute distress [EOMI] : extra ocular movement intact [No Respiratory Distress] : no respiratory distress [Clear to Auscultation] : lungs were clear to auscultation bilaterally [Normal S1, S2] : normal S1 and S2 [Normal Rate] : heart rate was normal [No Edema] : no peripheral edema [Not Tender] : non-tender [Soft] : abdomen soft [Acanthosis Nigricans] : no acanthosis nigricans [Foot Ulcers] : no foot ulcers [1+] : 1+ in the dorsalis pedis [2+] : 2+ in the dorsalis pedis [Vibration Dec.] : normal vibratory sensation at the level of the toes [Diminished Throughout Both Feet] : normal tactile sensation with monofilament testing throughout both feet [Oriented x3] : oriented to person, place, and time [Normal Affect] : the affect was normal [Normal Insight/Judgement] : insight and judgment were intact [Normal Mood] : the mood was normal [de-identified] : walks with walker [de-identified] : feet - dry skin, overgrown toenails

## 2023-01-27 NOTE — REVIEW OF SYSTEMS
[Fatigue] : no fatigue [Recent Weight Gain (___ Lbs)] : no recent weight gain [Recent Weight Loss (___ Lbs)] : no recent weight loss [Blurred Vision] : no blurred vision [Chest Pain] : no chest pain [Shortness Of Breath] : no shortness of breath [SOB on Exertion] : no shortness of breath on exertion [Nausea] : no nausea [Abdominal Pain] : no abdominal pain [Polyuria] : no polyuria [Nocturia] : no nocturia [Pain/Numbness of Digits] : no pain/numbness of digits [Polydipsia] : no polydipsia

## 2023-01-27 NOTE — ASSESSMENT
[FreeTextEntry1] : 87 year old female with\par 1. 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 Hypothyroid - euthyroid\par - cont Lt4 75 mcg daily, check TFTs\par \par 3. HLD - cont statin, check lipid panel\par \par all questions answered

## 2023-01-27 NOTE — DATA REVIEWED
[FreeTextEntry1] : Labs 11/30/22\par Cr 0.97, GFR 57\par A1c 7.9\par LDL 95, HDL 72, Tg 126\par urine alb/cr 18\par TSH 2.58\par UA (+) protein

## 2023-02-17 ENCOUNTER — APPOINTMENT (OUTPATIENT)
Dept: ENDOCRINOLOGY | Facility: CLINIC | Age: 88
End: 2023-02-17
Payer: MEDICARE

## 2023-02-17 PROCEDURE — 97802 MEDICAL NUTRITION INDIV IN: CPT

## 2023-02-24 ENCOUNTER — APPOINTMENT (OUTPATIENT)
Dept: NEUROLOGY | Facility: CLINIC | Age: 88
End: 2023-02-24
Payer: MEDICARE

## 2023-02-24 VITALS
SYSTOLIC BLOOD PRESSURE: 168 MMHG | DIASTOLIC BLOOD PRESSURE: 70 MMHG | BODY MASS INDEX: 29.82 KG/M2 | HEIGHT: 67 IN | WEIGHT: 190 LBS

## 2023-02-24 DIAGNOSIS — R41.3 OTHER AMNESIA: ICD-10-CM

## 2023-02-24 PROCEDURE — 99214 OFFICE O/P EST MOD 30 MIN: CPT

## 2023-02-24 NOTE — DATA REVIEWED
[de-identified] : Brain MRI dated 1/20/21 shows small vessel ischemic changes.\par Cervical spine MRI dated 1/21/21 shows spondylosis with mild central stenosis and neuroforaminal narrowing at C4-5 and C5-6. [de-identified] : EMG done 1/22/21 was technically limited.\par Emergency department notes and cardiology notes from 3/26/21 reviewed\par Potassium was 2.9.

## 2023-02-24 NOTE — HISTORY OF PRESENT ILLNESS
[FreeTextEntry1] : I saw her initially 6/8/21 with the following history. She is here with her daughter with whom she lives. daughter serves as independent historian. The end of March she passed out. She was sitting in a chair  leaning over and apparently slumped with loss of consciousness for a few minutes. There was no tongue biting or incontinence. There is no convulsive activity. She went to the emergency room. The notes reviewed and summarized below. She was found to have low potassium. She has been fine since without any recurrence. She was seen by cardiology in the hospital.\par \par She underwent workup recently for short-term memory loss as well as difficulty walking. Results summarized below.\par \par Medical history significant for hypertension, hyperlipidemia, diabetes, hypothyroid.\par \par I started her on donepezil 5 mg a day for her memory loss.  This was subsequently increased to 10 mg a day.  She has been tolerating it without a problem.  She lives with her daughter.  She has not done anything that would cause danger to herself or others.  There has been some slow decline in memory.

## 2023-02-24 NOTE — PHYSICAL EXAM
[FreeTextEntry1] : Exam limited due to limitations of telephonic visit \par \par Mental status: Alert, oriented to place but not time, speech comprehension intact, Short-term recall 2/3.  Naming intact\par \par Cranial nerves: No ptosis  Extraocular movements full. Facial strength and sensation are normal. Tongue midline.\par \par Motor: Strength grossly normal throughout. There is no drift.  No abnormal movements observed.\par \par Sensation: Intact to touch throughout\par \par Coordination: Finger-nose intact\par \par Reflexes symmetrical, hypoactive\par \par Ambulating well with her walker.\par \par \par

## 2023-02-24 NOTE — ASSESSMENT
[FreeTextEntry1] : Syncopal episode in the past.. No recurrence. Seen in the emergency department and by cardiology. Low potassium may have been contributing factor. No suggestion of seizure activity. No further neurological workup for that recommend\par \par Short-term memory loss. I have explained in great detail that currently available medications work to help slow down further memory decline rather than reverse existing memory loss and they seem to understand this.she will remain on donepezil 10 mg a day.  Raised the possibility of adding memantine but the patient does not want to take a second medication.  Stressed the importance of mental and physical exercise, adequate hydration and a healthy Mediterranean style diet.\par \par Follow-up in 1 year, sooner should the need arise

## 2023-02-24 NOTE — CONSULT LETTER
[Dear  ___] : Dear  [unfilled], [Consult Letter:] : I had the pleasure of evaluating your patient, [unfilled]. [Please see my note below.] : Please see my note below. [Consult Closing:] : Thank you very much for allowing me to participate in the care of this patient.  If you have any questions, please do not hesitate to contact me. [Sincerely,] : Sincerely, [FreeTextEntry3] : Aamir Daley MD, PhD, DPN-N\par [facility]\par Neurology at [place]\par Chief, Division of Neurology\par  [facility]\par

## 2023-05-11 LAB
HBA1C MFR BLD HPLC: 10.8
LDLC SERPL DIRECT ASSAY-MCNC: 72
TSH SERPL-ACNC: 3.06

## 2023-05-12 ENCOUNTER — APPOINTMENT (OUTPATIENT)
Dept: ENDOCRINOLOGY | Facility: CLINIC | Age: 88
End: 2023-05-12
Payer: MEDICARE

## 2023-05-12 ENCOUNTER — RESULT CHARGE (OUTPATIENT)
Age: 88
End: 2023-05-12

## 2023-05-12 VITALS
WEIGHT: 205 LBS | HEIGHT: 72 IN | OXYGEN SATURATION: 96 % | SYSTOLIC BLOOD PRESSURE: 140 MMHG | HEART RATE: 89 BPM | BODY MASS INDEX: 27.77 KG/M2 | DIASTOLIC BLOOD PRESSURE: 72 MMHG

## 2023-05-12 LAB — GLUCOSE BLDC GLUCOMTR-MCNC: 275

## 2023-05-12 PROCEDURE — 82962 GLUCOSE BLOOD TEST: CPT

## 2023-05-12 PROCEDURE — 99214 OFFICE O/P EST MOD 30 MIN: CPT | Mod: 25

## 2023-05-12 RX ORDER — GLYBURIDE 2.5 MG/1
2.5 TABLET ORAL
Qty: 180 | Refills: 0 | Status: DISCONTINUED | COMMUNITY
Start: 2023-02-15 | End: 2023-05-12

## 2023-05-12 NOTE — CONSULT LETTER
[Dear  ___] : Dear  [unfilled], [Please see my note below.] : Please see my note below. [Consult Closing:] : Thank you very much for allowing me to participate in the care of this patient.  If you have any questions, please do not hesitate to contact me. [Sincerely,] : Sincerely, [FreeTextEntry3] : Marquita Rivera, \par

## 2023-05-12 NOTE — DATA REVIEWED
[FreeTextEntry1] : Labs 11/30/22\par Cr 0.97, GFR 57\par A1c 7.9\par LDL 95, HDL 72, Tg 126\par urine alb/cr 18\par TSH 2.58\par UA (+) protein \par \par Labs 2/2023\par GFR 59\par TSH 3.06\par A1c 10.8

## 2023-05-12 NOTE — PHYSICAL EXAM
[Alert] : alert [No Acute Distress] : no acute distress [EOMI] : extra ocular movement intact [No Respiratory Distress] : no respiratory distress [Clear to Auscultation] : lungs were clear to auscultation bilaterally [Normal S1, S2] : normal S1 and S2 [Normal Rate] : heart rate was normal [No Edema] : no peripheral edema [Not Tender] : non-tender [Soft] : abdomen soft [Oriented x3] : oriented to person, place, and time [Normal Affect] : the affect was normal [Normal Insight/Judgement] : insight and judgment were intact [Normal Mood] : the mood was normal [Acanthosis Nigricans] : no acanthosis nigricans [Foot Ulcers] : no foot ulcers [de-identified] : walks with walker

## 2023-05-12 NOTE — ASSESSMENT
[FreeTextEntry1] : 87 year old female with\par 1. poorly controlled T2DM - PCP has been adjusting meds since last visit, the patient does not want any injectable meds, daughter felt that glycemic control was best on pioglitazone and now worsening off pioglitazone \par - we discussed hypoglycemic risks of glyburide leana in elderly people; also discussed that prior hypoglycemia likely due to this medication\par - stop Glyburide completely\par - start Pioglitazone 15 mg daily, which is also cost effective for pt\par - cont Januvia daily, but this may become too costly in next few months; elsieter to call back with dosing information\par - updated labs needed (will need home draw)\par - 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 Hypothyroid - euthyroid on exam\par - cont Lt4 75 mcg daily, check TFTs\par \par 3. HLD - cont statin\par \par all questions answered

## 2023-05-12 NOTE — HISTORY OF PRESENT ILLNESS
[FreeTextEntry1] : Interval hx: \par -since last visit: PCp increased Glyburide 2.5 BID 2-3 weeks ago, PCP started pioglitazone ?mg and it was stopped when Januvia 100 mg daily about 2 weeks go.\par -she does not want insulin \par -daughter felt that numbers were 119 when taking pioglitazone but stopped when FS 89\par - in past 2-3 weeks: 's per daughter\par - per daughter is snacking a lot\par \par Quality: Type 2\par Severity: uncontrolled\par Duration: 20+ years\par Onset: routine blood test\par Notes: no prior endo; she has never needed emergency treatment for hypo/hyperglycemia\par \par ASSOCIATED SYMPTOMS/COMPLICATIONS: denies complications, eye exam 2022\par \par MODIFYING FACTORS: \par Lifestyle: eats 2-3 meals daily, snacks (mason cracker and ice cream) in between meals\par Medication history: had been on Jardiance and Farixa but stopped due to insurance/cost, did not tolerate MFN in past, had been on pioglitazone but caused hypoglycemia\par Current DM meds: Glyburide 2.5 mg BID, Januvia 100 mg daily\par \par SMBG: Freestyle meter, testing 1x daily\par \par

## 2023-05-19 ENCOUNTER — NON-APPOINTMENT (OUTPATIENT)
Age: 88
End: 2023-05-19

## 2023-05-25 ENCOUNTER — NON-APPOINTMENT (OUTPATIENT)
Age: 88
End: 2023-05-25

## 2023-06-09 ENCOUNTER — NON-APPOINTMENT (OUTPATIENT)
Age: 88
End: 2023-06-09

## 2023-06-30 ENCOUNTER — OFFICE (OUTPATIENT)
Dept: URBAN - METROPOLITAN AREA CLINIC 112 | Facility: CLINIC | Age: 88
Setting detail: OPHTHALMOLOGY
End: 2023-06-30
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
## 2023-06-30 PROCEDURE — NO SHOW FE NO SHOW FEE: Performed by: OPHTHALMOLOGY

## 2023-08-30 ENCOUNTER — LABORATORY RESULT (OUTPATIENT)
Age: 88
End: 2023-08-30

## 2023-08-30 LAB
HBA1C MFR BLD HPLC: 10.2
LDLC SERPL DIRECT ASSAY-MCNC: 76
MICROALBUMIN/CREAT 24H UR-RTO: 36
TSH SERPL-ACNC: 4.86

## 2023-08-31 ENCOUNTER — APPOINTMENT (OUTPATIENT)
Dept: ENDOCRINOLOGY | Facility: CLINIC | Age: 88
End: 2023-08-31
Payer: MEDICARE

## 2023-08-31 VITALS
BODY MASS INDEX: 27.09 KG/M2 | HEIGHT: 72 IN | DIASTOLIC BLOOD PRESSURE: 70 MMHG | WEIGHT: 200 LBS | HEART RATE: 87 BPM | SYSTOLIC BLOOD PRESSURE: 132 MMHG

## 2023-08-31 LAB — GLUCOSE BLDC GLUCOMTR-MCNC: 223

## 2023-08-31 PROCEDURE — 99214 OFFICE O/P EST MOD 30 MIN: CPT | Mod: 25

## 2023-08-31 PROCEDURE — 82962 GLUCOSE BLOOD TEST: CPT

## 2023-08-31 RX ORDER — SITAGLIPTIN 100 MG/1
TABLET, FILM COATED ORAL
Refills: 0 | Status: DISCONTINUED | COMMUNITY
End: 2023-08-31

## 2023-08-31 NOTE — PHYSICAL EXAM
[Alert] : alert [No Acute Distress] : no acute distress [EOMI] : extra ocular movement intact [No Respiratory Distress] : no respiratory distress [Clear to Auscultation] : lungs were clear to auscultation bilaterally [Normal S1, S2] : normal S1 and S2 [Normal Rate] : heart rate was normal [No Edema] : no peripheral edema [Not Tender] : non-tender [Soft] : abdomen soft [Oriented x3] : oriented to person, place, and time [Normal Affect] : the affect was normal [Normal Insight/Judgement] : insight and judgment were intact [Normal Mood] : the mood was normal [Acanthosis Nigricans] : no acanthosis nigricans [Foot Ulcers] : no foot ulcers [de-identified] : walks with walker

## 2023-08-31 NOTE — HISTORY OF PRESENT ILLNESS
[FreeTextEntry1] : Interval hx:  - cannot afford Januvia, does not know if insurance will not cover it or if she is in donut hole - denies hypoglycemia, denies severe hyperglycemia  Quality: Type 2 Severity: uncontrolled Duration: 20+ years Onset: routine blood test Notes: no prior endo; she has never needed emergency treatment for hypo/hyperglycemia  ASSOCIATED SYMPTOMS/COMPLICATIONS: denies complications, eye exam 2022  MODIFYING FACTORS:  Lifestyle: eats 2-3 meals daily, snacks (mason cracker and ice cream) in between meals Medication history: had been on Jardiance and Farixa but stopped due to insurance/cost, did not tolerate MFN in past, had been on pioglitazone but caused hypoglycemia Current DM meds: Glipizide 5 mg daily, Januvia 100 mg daily, Pioglitazone 30 mg daily  SMBG: Freestyle meter, testing 1x daily, fasting - 's by history  Hypothyroid - adherent with LT4 88 mcg daily right before breakfast

## 2023-08-31 NOTE — ASSESSMENT
[FreeTextEntry1] : 88 year old female with 1. 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 Hypothyroid - euthyroid on exam - cont Lt4 88 mcg daily, check TFTs  3. HLD - cont statin  all questions answered

## 2023-11-14 ENCOUNTER — RX RENEWAL (OUTPATIENT)
Age: 88
End: 2023-11-14

## 2023-11-30 ENCOUNTER — LABORATORY RESULT (OUTPATIENT)
Age: 88
End: 2023-11-30

## 2023-11-30 ENCOUNTER — APPOINTMENT (OUTPATIENT)
Dept: ENDOCRINOLOGY | Facility: CLINIC | Age: 88
End: 2023-11-30
Payer: MEDICARE

## 2023-11-30 VITALS
SYSTOLIC BLOOD PRESSURE: 144 MMHG | OXYGEN SATURATION: 97 % | HEART RATE: 92 BPM | HEIGHT: 72 IN | BODY MASS INDEX: 27.09 KG/M2 | WEIGHT: 200 LBS | DIASTOLIC BLOOD PRESSURE: 70 MMHG

## 2023-11-30 LAB — GLUCOSE BLDC GLUCOMTR-MCNC: 158

## 2023-11-30 PROCEDURE — 99214 OFFICE O/P EST MOD 30 MIN: CPT | Mod: 25

## 2023-11-30 PROCEDURE — 82962 GLUCOSE BLOOD TEST: CPT

## 2023-11-30 PROCEDURE — 36415 COLL VENOUS BLD VENIPUNCTURE: CPT

## 2023-12-05 ENCOUNTER — NON-APPOINTMENT (OUTPATIENT)
Age: 88
End: 2023-12-05

## 2024-02-29 ENCOUNTER — APPOINTMENT (OUTPATIENT)
Dept: ENDOCRINOLOGY | Facility: CLINIC | Age: 89
End: 2024-02-29

## 2024-03-04 ENCOUNTER — APPOINTMENT (OUTPATIENT)
Dept: NEUROLOGY | Facility: CLINIC | Age: 89
End: 2024-03-04

## 2024-04-02 ENCOUNTER — APPOINTMENT (OUTPATIENT)
Dept: ENDOCRINOLOGY | Facility: CLINIC | Age: 89
End: 2024-04-02
Payer: MEDICARE

## 2024-04-02 DIAGNOSIS — E78.5 HYPERLIPIDEMIA, UNSPECIFIED: ICD-10-CM

## 2024-04-02 DIAGNOSIS — E11.65 TYPE 2 DIABETES MELLITUS WITH HYPERGLYCEMIA: ICD-10-CM

## 2024-04-02 DIAGNOSIS — E03.8 OTHER SPECIFIED HYPOTHYROIDISM: ICD-10-CM

## 2024-04-02 PROCEDURE — 99214 OFFICE O/P EST MOD 30 MIN: CPT

## 2024-04-02 PROCEDURE — G2211 COMPLEX E/M VISIT ADD ON: CPT

## 2024-04-02 RX ORDER — DONEPEZIL HYDROCHLORIDE 10 MG/1
10 TABLET ORAL
Qty: 90 | Refills: 3 | Status: DISCONTINUED | COMMUNITY
Start: 2021-08-18 | End: 2024-04-02

## 2024-04-02 NOTE — ASSESSMENT
[FreeTextEntry1] : 88 year old female with 1. 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 Hypothyroid - euthyroid on exam, no recent labs - cont Lt4 75 mcg daily, clabs from PCP  3. HLD - cont statin, labs from PCP  all questions answered.

## 2024-04-02 NOTE — DATA REVIEWED
[FreeTextEntry1] : Labs 11/30/22 Cr 0.97, GFR 57 A1c 7.9 LDL 95, HDL 72, Tg 126 urine alb/cr 18 TSH 2.58 UA (+) protein   Labs 2/2023 GFR 59 TSH 3.06 A1c 10.8

## 2024-04-02 NOTE — PHYSICAL EXAM
[Alert] : alert [No Acute Distress] : no acute distress [EOMI] : extra ocular movement intact [No Respiratory Distress] : no respiratory distress [Oriented x3] : oriented to person, place, and time [Normal Affect] : the affect was normal [Normal Insight/Judgement] : insight and judgment were intact [Normal Mood] : the mood was normal [de-identified] : walks with walker

## 2024-04-02 NOTE — REASON FOR VISIT
[Follow - Up] : a follow-up visit [DM Type 2] : DM Type 2 [Home] : at home, [unfilled] , at the time of the visit. [Medical Office: ([facility])___] : at the medical office located in  [Family Member] : family member [Patient] : the patient

## 2024-04-02 NOTE — REVIEW OF SYSTEMS
[Polyuria] : polyuria [Fatigue] : no fatigue [Recent Weight Loss (___ Lbs)] : no recent weight loss [Recent Weight Gain (___ Lbs)] : no recent weight gain [Blurred Vision] : no blurred vision [Chest Pain] : no chest pain [SOB on Exertion] : no shortness of breath on exertion [Shortness Of Breath] : no shortness of breath [Abdominal Pain] : no abdominal pain [Nausea] : no nausea [Polydipsia] : no polydipsia [Nocturia] : no nocturia [Pain/Numbness of Digits] : no pain/numbness of digits

## 2024-04-02 NOTE — HISTORY OF PRESENT ILLNESS
[FreeTextEntry1] : Interval hx:  - testing 1x daily - Freestyle meter, , 194, 197, yest 208 - current DM  /thyroid meds:  Tradjenta 5 mg daily, Glipizide 5 mg daily, Piogltazone 30 mg daily. LT4 75 mcg daily - no recent labs in EMR, ?labs from PCP done 1 month ago  Quality: Type 2 Severity: uncontrolled Duration: 20+ years Onset: routine blood test Notes: no prior endo; she has never needed emergency treatment for hypo/hyperglycemia ASSOCIATED SYMPTOMS/COMPLICATIONS: denies complications, eye exam 2022  MODIFYING FACTORS:  Lifestyle: eats 2-3 meals daily, like to eat mason crackers Medication history: had been on Jardiance and Farixa but stopped due to insurance/cost, did not tolerate MFN in past, had been on pioglitazone but caused hypoglycemia Current DM meds: Glipizide 5 mg daily, Tradjenta 5 mg daily, Pioglitazone 30 mg daily  Hypothyroid - adherent with LT4 75 mcg daily right before breakfast

## 2024-04-24 ENCOUNTER — RX RENEWAL (OUTPATIENT)
Age: 89
End: 2024-04-24

## 2024-04-24 RX ORDER — LEVOTHYROXINE SODIUM 0.07 MG/1
75 TABLET ORAL DAILY
Qty: 30 | Refills: 2 | Status: ACTIVE | COMMUNITY
Start: 2020-10-27 | End: 1900-01-01

## 2024-05-06 ENCOUNTER — RX RENEWAL (OUTPATIENT)
Age: 89
End: 2024-05-06

## 2024-05-06 RX ORDER — GLIPIZIDE 5 MG/1
5 TABLET ORAL DAILY
Qty: 30 | Refills: 5 | Status: ACTIVE | COMMUNITY
Start: 2023-06-09 | End: 1900-01-01

## 2024-06-26 ENCOUNTER — RX RENEWAL (OUTPATIENT)
Age: 89
End: 2024-06-26

## 2024-06-26 RX ORDER — PIOGLITAZONE HYDROCHLORIDE 30 MG/1
30 TABLET ORAL
Qty: 90 | Refills: 1 | Status: ACTIVE | COMMUNITY
Start: 2023-05-12 | End: 1900-01-01

## 2024-06-26 RX ORDER — LINAGLIPTIN 5 MG/1
5 TABLET, FILM COATED ORAL
Qty: 90 | Refills: 1 | Status: ACTIVE | COMMUNITY
Start: 2023-08-31 | End: 1900-01-01

## 2024-07-18 ENCOUNTER — RX RENEWAL (OUTPATIENT)
Age: 89
End: 2024-07-18

## 2024-11-01 ENCOUNTER — RX RENEWAL (OUTPATIENT)
Age: 89
End: 2024-11-01

## 2024-11-05 ENCOUNTER — RX RENEWAL (OUTPATIENT)
Age: 89
End: 2024-11-05

## 2024-11-15 ENCOUNTER — APPOINTMENT (OUTPATIENT)
Dept: ENDOCRINOLOGY | Facility: CLINIC | Age: 89
End: 2024-11-15
Payer: MEDICARE

## 2024-11-15 VITALS
BODY MASS INDEX: 24.24 KG/M2 | HEART RATE: 105 BPM | HEIGHT: 72 IN | SYSTOLIC BLOOD PRESSURE: 142 MMHG | OXYGEN SATURATION: 98 % | WEIGHT: 179 LBS | DIASTOLIC BLOOD PRESSURE: 60 MMHG

## 2024-11-15 DIAGNOSIS — Z13.820 ENCOUNTER FOR SCREENING FOR OSTEOPOROSIS: ICD-10-CM

## 2024-11-15 DIAGNOSIS — E78.5 HYPERLIPIDEMIA, UNSPECIFIED: ICD-10-CM

## 2024-11-15 DIAGNOSIS — E11.65 TYPE 2 DIABETES MELLITUS WITH HYPERGLYCEMIA: ICD-10-CM

## 2024-11-15 DIAGNOSIS — E03.8 OTHER SPECIFIED HYPOTHYROIDISM: ICD-10-CM

## 2024-11-15 LAB — GLUCOSE BLDC GLUCOMTR-MCNC: 273

## 2024-11-15 PROCEDURE — 82962 GLUCOSE BLOOD TEST: CPT

## 2024-11-15 PROCEDURE — 99214 OFFICE O/P EST MOD 30 MIN: CPT

## 2024-12-04 ENCOUNTER — RX RENEWAL (OUTPATIENT)
Age: 89
End: 2024-12-04

## 2024-12-19 ENCOUNTER — RX RENEWAL (OUTPATIENT)
Age: 89
End: 2024-12-19

## 2025-01-09 ENCOUNTER — RX RENEWAL (OUTPATIENT)
Age: 89
End: 2025-01-09

## 2025-03-27 LAB
HBA1C MFR BLD HPLC: 7.9
LDLC SERPL DIRECT ASSAY-MCNC: 204
MICROALBUMIN/CREAT 24H UR-RTO: NORMAL
TSH SERPL-ACNC: 4.72

## 2025-03-28 ENCOUNTER — APPOINTMENT (OUTPATIENT)
Dept: ENDOCRINOLOGY | Facility: CLINIC | Age: 89
End: 2025-03-28
Payer: MEDICARE

## 2025-03-28 DIAGNOSIS — Z13.820 ENCOUNTER FOR SCREENING FOR OSTEOPOROSIS: ICD-10-CM

## 2025-03-28 DIAGNOSIS — E03.8 OTHER SPECIFIED HYPOTHYROIDISM: ICD-10-CM

## 2025-03-28 DIAGNOSIS — E11.65 TYPE 2 DIABETES MELLITUS WITH HYPERGLYCEMIA: ICD-10-CM

## 2025-03-28 DIAGNOSIS — E78.5 HYPERLIPIDEMIA, UNSPECIFIED: ICD-10-CM

## 2025-03-28 PROCEDURE — 99214 OFFICE O/P EST MOD 30 MIN: CPT | Mod: 2W

## 2025-05-14 ENCOUNTER — RX RENEWAL (OUTPATIENT)
Age: 89
End: 2025-05-14

## 2025-05-16 ENCOUNTER — RX RENEWAL (OUTPATIENT)
Age: 89
End: 2025-05-16

## 2025-06-13 ENCOUNTER — RX RENEWAL (OUTPATIENT)
Age: 89
End: 2025-06-13

## 2025-06-24 ENCOUNTER — RX RENEWAL (OUTPATIENT)
Age: 89
End: 2025-06-24

## 2025-07-22 ENCOUNTER — APPOINTMENT (OUTPATIENT)
Dept: ENDOCRINOLOGY | Facility: CLINIC | Age: 89
End: 2025-07-22
Payer: MEDICARE

## 2025-07-22 VITALS
HEIGHT: 72 IN | WEIGHT: 197 LBS | HEART RATE: 94 BPM | OXYGEN SATURATION: 95 % | DIASTOLIC BLOOD PRESSURE: 60 MMHG | BODY MASS INDEX: 26.68 KG/M2 | SYSTOLIC BLOOD PRESSURE: 138 MMHG

## 2025-07-22 DIAGNOSIS — E03.8 OTHER SPECIFIED HYPOTHYROIDISM: ICD-10-CM

## 2025-07-22 DIAGNOSIS — E78.5 HYPERLIPIDEMIA, UNSPECIFIED: ICD-10-CM

## 2025-07-22 DIAGNOSIS — E11.65 TYPE 2 DIABETES MELLITUS WITH HYPERGLYCEMIA: ICD-10-CM

## 2025-07-22 LAB — GLUCOSE BLDC GLUCOMTR-MCNC: 157

## 2025-07-22 PROCEDURE — 99214 OFFICE O/P EST MOD 30 MIN: CPT

## 2025-07-22 PROCEDURE — 82962 GLUCOSE BLOOD TEST: CPT

## 2025-07-22 PROCEDURE — G2211 COMPLEX E/M VISIT ADD ON: CPT

## 2025-08-19 ENCOUNTER — RX RENEWAL (OUTPATIENT)
Age: 89
End: 2025-08-19

## 2025-09-16 ENCOUNTER — NON-APPOINTMENT (OUTPATIENT)
Age: 89
End: 2025-09-16